# Patient Record
Sex: MALE | Race: WHITE | NOT HISPANIC OR LATINO | Employment: UNEMPLOYED | URBAN - METROPOLITAN AREA
[De-identification: names, ages, dates, MRNs, and addresses within clinical notes are randomized per-mention and may not be internally consistent; named-entity substitution may affect disease eponyms.]

---

## 2021-08-15 ENCOUNTER — HOSPITAL ENCOUNTER (EMERGENCY)
Facility: HOSPITAL | Age: 71
Discharge: HOME | End: 2021-08-16
Attending: EMERGENCY MEDICINE | Admitting: EMERGENCY MEDICINE

## 2021-08-15 DIAGNOSIS — M79.672 PAIN IN BOTH FEET: ICD-10-CM

## 2021-08-15 DIAGNOSIS — M79.671 PAIN IN BOTH FEET: ICD-10-CM

## 2021-08-15 DIAGNOSIS — S90.822A BLISTER (NONTHERMAL), LEFT FOOT, INITIAL ENCOUNTER: Primary | ICD-10-CM

## 2021-08-15 PROCEDURE — 99283 EMERGENCY DEPT VISIT LOW MDM: CPT

## 2021-08-16 VITALS
HEART RATE: 62 BPM | RESPIRATION RATE: 16 BRPM | WEIGHT: 180 LBS | BODY MASS INDEX: 27.28 KG/M2 | SYSTOLIC BLOOD PRESSURE: 154 MMHG | HEIGHT: 68 IN | TEMPERATURE: 98 F | OXYGEN SATURATION: 95 % | DIASTOLIC BLOOD PRESSURE: 86 MMHG

## 2021-08-16 LAB — POCT GLUCOSE: 89 MG/DL (ref 70–110)

## 2021-08-16 PROCEDURE — 25000003 PHARM REV CODE 250: Performed by: EMERGENCY MEDICINE

## 2021-08-16 RX ORDER — BACITRACIN ZINC 500 UNIT/G
OINTMENT (GRAM) TOPICAL 2 TIMES DAILY
Qty: 30 G | Refills: 0 | Status: SHIPPED | OUTPATIENT
Start: 2021-08-16

## 2021-08-16 RX ADMIN — BACITRACIN, NEOMYCIN, POLYMYXIN B 1 EACH: 400; 3.5; 5 OINTMENT TOPICAL at 12:08

## 2021-08-19 ENCOUNTER — HOSPITAL ENCOUNTER (EMERGENCY)
Facility: HOSPITAL | Age: 71
Discharge: HOME | End: 2021-08-19
Attending: EMERGENCY MEDICINE

## 2021-08-19 VITALS
RESPIRATION RATE: 19 BRPM | TEMPERATURE: 99 F | SYSTOLIC BLOOD PRESSURE: 159 MMHG | DIASTOLIC BLOOD PRESSURE: 86 MMHG | OXYGEN SATURATION: 97 % | HEART RATE: 67 BPM

## 2021-08-19 DIAGNOSIS — Z76.0 MEDICATION REFILL: Primary | ICD-10-CM

## 2021-08-19 DIAGNOSIS — S90.829D BLISTER OF FOOT, UNSPECIFIED LATERALITY, SUBSEQUENT ENCOUNTER: ICD-10-CM

## 2021-08-19 PROCEDURE — 99283 EMERGENCY DEPT VISIT LOW MDM: CPT

## 2021-08-19 RX ORDER — BACITRACIN ZINC 500 UNIT/G
OINTMENT (GRAM) TOPICAL 2 TIMES DAILY
Qty: 30 G | Refills: 0 | Status: SHIPPED | OUTPATIENT
Start: 2021-08-19

## 2024-03-04 ENCOUNTER — HOSPITAL ENCOUNTER (EMERGENCY)
Facility: HOSPITAL | Age: 74
Discharge: HOME | End: 2024-03-04
Attending: EMERGENCY MEDICINE

## 2024-03-04 VITALS
BODY MASS INDEX: 30.53 KG/M2 | HEIGHT: 66 IN | HEART RATE: 70 BPM | WEIGHT: 190 LBS | DIASTOLIC BLOOD PRESSURE: 78 MMHG | OXYGEN SATURATION: 95 % | TEMPERATURE: 99 F | SYSTOLIC BLOOD PRESSURE: 145 MMHG | RESPIRATION RATE: 18 BRPM

## 2024-03-04 DIAGNOSIS — J18.9 COMMUNITY ACQUIRED PNEUMONIA OF LEFT LOWER LOBE OF LUNG: ICD-10-CM

## 2024-03-04 DIAGNOSIS — R05.9 COUGH: ICD-10-CM

## 2024-03-04 DIAGNOSIS — J18.9 PNEUMONIA OF LEFT LOWER LOBE DUE TO INFECTIOUS ORGANISM: Primary | ICD-10-CM

## 2024-03-04 LAB
ALBUMIN SERPL BCP-MCNC: 3.8 G/DL (ref 3.5–5.2)
ALP SERPL-CCNC: 71 U/L (ref 55–135)
ALT SERPL W/O P-5'-P-CCNC: 15 U/L (ref 10–44)
ANION GAP SERPL CALC-SCNC: 13 MMOL/L (ref 8–16)
APAP SERPL-MCNC: <3 UG/ML (ref 10–20)
AST SERPL-CCNC: 18 U/L (ref 10–40)
BASOPHILS # BLD AUTO: 0.04 K/UL (ref 0–0.2)
BASOPHILS NFR BLD: 0.5 % (ref 0–1.9)
BILIRUB SERPL-MCNC: 0.4 MG/DL (ref 0.1–1)
BUN SERPL-MCNC: 13 MG/DL (ref 8–23)
CALCIUM SERPL-MCNC: 9 MG/DL (ref 8.7–10.5)
CHLORIDE SERPL-SCNC: 106 MMOL/L (ref 95–110)
CO2 SERPL-SCNC: 21 MMOL/L (ref 23–29)
CREAT SERPL-MCNC: 0.9 MG/DL (ref 0.5–1.4)
DIFFERENTIAL METHOD BLD: ABNORMAL
EOSINOPHIL # BLD AUTO: 0.3 K/UL (ref 0–0.5)
EOSINOPHIL NFR BLD: 4.3 % (ref 0–8)
ERYTHROCYTE [DISTWIDTH] IN BLOOD BY AUTOMATED COUNT: 12.9 % (ref 11.5–14.5)
EST. GFR  (NO RACE VARIABLE): >60 ML/MIN/1.73 M^2
ETHANOL SERPL-MCNC: <10 MG/DL
GLUCOSE SERPL-MCNC: 90 MG/DL (ref 70–110)
HCT VFR BLD AUTO: 41.1 % (ref 40–54)
HGB BLD-MCNC: 14.3 G/DL (ref 14–18)
IMM GRANULOCYTES # BLD AUTO: 0.03 K/UL (ref 0–0.04)
IMM GRANULOCYTES NFR BLD AUTO: 0.4 % (ref 0–0.5)
LYMPHOCYTES # BLD AUTO: 1.9 K/UL (ref 1–4.8)
LYMPHOCYTES NFR BLD: 25 % (ref 18–48)
MCH RBC QN AUTO: 32.1 PG (ref 27–31)
MCHC RBC AUTO-ENTMCNC: 34.8 G/DL (ref 32–36)
MCV RBC AUTO: 92 FL (ref 82–98)
MONOCYTES # BLD AUTO: 0.9 K/UL (ref 0.3–1)
MONOCYTES NFR BLD: 11.7 % (ref 4–15)
NEUTROPHILS # BLD AUTO: 4.3 K/UL (ref 1.8–7.7)
NEUTROPHILS NFR BLD: 58.1 % (ref 38–73)
NRBC BLD-RTO: 0 /100 WBC
PLATELET # BLD AUTO: 274 K/UL (ref 150–450)
PMV BLD AUTO: 8.4 FL (ref 9.2–12.9)
POTASSIUM SERPL-SCNC: 3.7 MMOL/L (ref 3.5–5.1)
PROT SERPL-MCNC: 7.4 G/DL (ref 6–8.4)
RBC # BLD AUTO: 4.46 M/UL (ref 4.6–6.2)
SODIUM SERPL-SCNC: 140 MMOL/L (ref 136–145)
TSH SERPL DL<=0.005 MIU/L-ACNC: 2.3 UIU/ML (ref 0.4–4)
WBC # BLD AUTO: 7.43 K/UL (ref 3.9–12.7)

## 2024-03-04 PROCEDURE — 80143 DRUG ASSAY ACETAMINOPHEN: CPT | Performed by: EMERGENCY MEDICINE

## 2024-03-04 PROCEDURE — 25000003 PHARM REV CODE 250: Performed by: EMERGENCY MEDICINE

## 2024-03-04 PROCEDURE — 85025 COMPLETE CBC W/AUTO DIFF WBC: CPT | Performed by: EMERGENCY MEDICINE

## 2024-03-04 PROCEDURE — G0425 INPT/ED TELECONSULT30: HCPCS | Mod: GT,,, | Performed by: PSYCHIATRY & NEUROLOGY

## 2024-03-04 PROCEDURE — 99285 EMERGENCY DEPT VISIT HI MDM: CPT | Mod: 25

## 2024-03-04 PROCEDURE — 84443 ASSAY THYROID STIM HORMONE: CPT | Performed by: EMERGENCY MEDICINE

## 2024-03-04 PROCEDURE — 80053 COMPREHEN METABOLIC PANEL: CPT | Performed by: EMERGENCY MEDICINE

## 2024-03-04 PROCEDURE — 82077 ASSAY SPEC XCP UR&BREATH IA: CPT | Performed by: EMERGENCY MEDICINE

## 2024-03-04 RX ORDER — AMOXICILLIN 500 MG/1
1000 CAPSULE ORAL 3 TIMES DAILY
Qty: 42 CAPSULE | Refills: 0 | Status: SHIPPED | OUTPATIENT
Start: 2024-03-04 | End: 2024-03-11

## 2024-03-04 RX ORDER — DOXYCYCLINE HYCLATE 100 MG
100 TABLET ORAL EVERY 12 HOURS
Status: DISCONTINUED | OUTPATIENT
Start: 2024-03-04 | End: 2024-03-04 | Stop reason: HOSPADM

## 2024-03-04 RX ORDER — AMOXICILLIN 250 MG/1
1000 CAPSULE ORAL
Status: COMPLETED | OUTPATIENT
Start: 2024-03-04 | End: 2024-03-04

## 2024-03-04 RX ORDER — DOXYCYCLINE 100 MG/1
100 CAPSULE ORAL 2 TIMES DAILY
Qty: 14 CAPSULE | Refills: 0 | Status: SHIPPED | OUTPATIENT
Start: 2024-03-04 | End: 2024-03-11

## 2024-03-04 RX ADMIN — AMOXICILLIN 1000 MG: 250 CAPSULE ORAL at 05:03

## 2024-03-04 RX ADMIN — DOXYCYCLINE HYCLATE 100 MG: 100 TABLET, COATED ORAL at 05:03

## 2024-03-04 NOTE — ED NOTES
Pt attempted to urinate but was unsuccessful. Bladder scan performed, pt did not have anything in bladder. Pt provided with pitcher of water, carton of milk, and food tray. Dr. Fraga made aware of the delay

## 2024-03-04 NOTE — ED PROVIDER NOTES
"Encounter Date: 3/4/2024       History     Chief Complaint   Patient presents with    Shortness of Breath     Patient states he was [place] out [place] state last [place] and was given IV and oral antibiotics. He is finished the course of meds and his symptoms have only mildy improved. He reports ongoing shortness of breath and productive cough. He reports chills and feeling sweaty but unknown if he has recent fever. He is also requesting psych eval for racing thoughts. Denies psych hx, SI, HI, AVH    Psychiatric Evaluation     Patient is a 73-year-old male who presents to the ED with complaint of shortness of breath, cough and psychiatric evaluation.  Patient states he was diagnosed with pneumonia approximately 10 days ago.  He states he was started on both amoxicillin and doxycycline they reports minimal improvement of his symptoms.  He denies any fevers chills chest pain or current shortness of breath when questioned.  Furthermore he denies any nausea vomiting diarrhea night sweats.  Patient also reports increased "racing thoughts" and forgetfulness over the past several days. He denies any significant psych history when questioned. He denies any SI/HI/AVH.       Review of patient's allergies indicates:   Allergen Reactions    Abilify [aripiprazole]      No past medical history on file.  No past surgical history on file.  No family history on file.     Review of Systems   Constitutional:  Negative for chills and fever.   Respiratory:  Positive for cough. Negative for shortness of breath.    Cardiovascular:  Negative for chest pain, palpitations and leg swelling.   Gastrointestinal:  Negative for abdominal pain and nausea.   Genitourinary:  Negative for dysuria and flank pain.   Musculoskeletal:  Negative for arthralgias and gait problem.   Skin:  Negative for pallor and rash.   Neurological:  Negative for dizziness and headaches.   Psychiatric/Behavioral:  Negative for agitation, confusion, " hallucinations, self-injury, sleep disturbance and suicidal ideas. The patient is not nervous/anxious.        Physical Exam     Initial Vitals [03/04/24 0117]   BP Pulse Resp Temp SpO2   (!) 145/78 70 18 98.5 °F (36.9 °C) 95 %      MAP       --         Physical Exam    Nursing note and vitals reviewed.  Constitutional: He appears well-developed and well-nourished.   Well-appearing   No acute distress    HENT:   Head: Normocephalic and atraumatic.   Eyes: Conjunctivae and EOM are normal. Pupils are equal, round, and reactive to light.   Neck: Neck supple.   Normal range of motion.  Cardiovascular:  Normal rate, regular rhythm, normal heart sounds and intact distal pulses.           Pulmonary/Chest: Breath sounds normal.   Abdominal: Abdomen is soft. Bowel sounds are normal.   Musculoskeletal:         General: Normal range of motion.      Cervical back: Normal range of motion and neck supple.     Neurological: He is alert and oriented to person, place, and time.   Skin: Skin is warm. Capillary refill takes less than 2 seconds.   Psychiatric: His speech is delayed and tangential. Thought content is not paranoid and not delusional. He expresses no homicidal and no suicidal ideation. He expresses no suicidal plans and no homicidal plans.   Patient tangential, thought blocking at times.  Did not appear to be responding to internal stimuli; does not appear paranoid or homicidal or suicidal.  He is not aggressive he is oriented to person place and time.         ED Course   Procedures  Labs Reviewed   CBC W/ AUTO DIFFERENTIAL - Abnormal; Notable for the following components:       Result Value    RBC 4.46 (*)     MCH 32.1 (*)     MPV 8.4 (*)     All other components within normal limits   COMPREHENSIVE METABOLIC PANEL - Abnormal; Notable for the following components:    CO2 21 (*)     All other components within normal limits   ACETAMINOPHEN LEVEL - Abnormal; Notable for the following components:    Acetaminophen (Tylenol),  Serum <3.0 (*)     All other components within normal limits   TSH   ALCOHOL,MEDICAL (ETHANOL)   URINALYSIS, REFLEX TO URINE CULTURE   DRUG SCREEN PANEL, URINE EMERGENCY          Imaging Results              X-Ray Chest AP Portable (Final result)  Result time 03/04/24 02:50:56      Final result by Krzysztof Estrada MD (03/04/24 02:50:56)                   Impression:      Pulmonary opacity at the left lung base may relate to components of pulmonary infiltrate/airspace disease with possible confluence and peripheral consolidation and possible small pleural effusion.    Accentuation of interstitial markings bilaterally may relate to a component of mild diffuse interstitial infiltrate/edema.    Follow-up to document resolution is recommended.      Electronically signed by: Krzysztof Estrada  Date:    03/04/2024  Time:    02:50               Narrative:    EXAMINATION:  XR CHEST AP PORTABLE    CLINICAL HISTORY:  Cough, unspecified    TECHNIQUE:  Single frontal view of the chest was performed.    COMPARISON:  None    FINDINGS:  Single portable chest view is submitted.  There is diminished depth of inspiration.  This exaggerates the appearance of the cardiomediastinal silhouette, when accounting for the aforementioned there may be mild cardiac enlargement.    Accentuation of pulmonary bronchovascular markings consistent with diminished depth of inspiration noted.  There is appearance of may relate to superimposed mild interstitial infiltrate/edema.    Asymmetric opacity at the left lung base may relate to pulmonary infiltrate, this is more dense peripherally and may relate to a component of confluent infiltrates/airspace disease, the possibly of a small amount of pleural fluid at the left costophrenic angle is also considered.    There is no significant pleural fluid on the right, there is no pneumothorax bilaterally.                                       Medications   doxycycline tablet 100 mg (100 mg Oral Given 3/4/24  "0513)   amoxicillin capsule 1,000 mg (1,000 mg Oral Given 3/4/24 0514)     Medical Decision Making  Amount and/or Complexity of Data Reviewed  Labs: ordered. Decision-making details documented in ED Course.  Radiology: ordered.    Risk  Prescription drug management.               ED Course as of 03/04/24 0520   Mon Mar 04, 2024   0234 WBC: 7.43 [LC]   0234 RBC(!): 4.46 [LC]   0234 Hemoglobin: 14.3 [LC]   0234 Sodium: 140 [LC]   0234 Potassium: 3.7 [LC]   0234 Creatinine: 0.9 [LC]   0243 Acetaminophen Level(!): <3.0 [LC]   0243 Alcohol, Serum: <10 [LC]   0301 Chest x-ray:  Opacity at LLL base per my independent interpretation.  [LC]   0306 Discussed case with the tele psychiatrist who evaluated the patient.  She has not feel the patient merits PCR involuntary admission at this time. [LC]      ED Course User Index  [LC] Akhil Fraga MD               Medical Decision Making:   Initial Assessment:   See HPI   Clinical Tests:   Lab Tests: Reviewed and Ordered  Radiological Study: Ordered and Reviewed  ED Management:  - VSS; pt afebrile; NAD; non toxic; not septic  - CXR with LLL infiltrate; per chart check, pt with LLL in 1/24; diagnosed with PNA in [place] per patient  - Routine ED labs unremarkable for acute abnormality  - in light of pt's complaint of "racing thoughts" pt formally evaluated by telepsych provider  - psychiatrist does not feel pt merits PEC, psych hospitalization  - will discharge with rx Amoxicillin, Doxycycline for presumed CAP  - pt denies any urinary complaints; pt unable/unwilling to provide urine specimen   - pt stable for discharge from an EM standpoint   - No further intervention is indicated at this time after having taken into account the patient's history, physical exam findings, and empirical and objective data obtained during the patient's emergency department workup.   - The patient is at low risk for an emergent medical condition at this time, and I am of the belief that that " it is safe to discharge the patient from the emergency department.   - The patient is instructed to follow up as outpatient as indicated on the discharge paperwork.    - I have discussed the specifics of the workup with the patient and the patient has verbalized understanding of the details of the workup, the diagnosis, the treatment plan, and the need for outpatient follow-up.    - Although the patient has no emergent etiology today this does not preclude the development of an emergent condition so, in addition, I have advised the patient that they can return to the ED and/or activate EMS at any time with worsening of their symptoms, change of their symptoms, or with any other medical complaint.    - The patient remained comfortable and stable during their visit in the ED.    - Discharge and follow-up instructions discussed with the patient who expressed understanding and willingness to comply with my recommendations.  - Results of all emergency department tests  discussed thoroughly with patient; all patient questions answered; pt in agreement with plan  - Pt instructed to follow up with PCP in 2-3 days for recheck of today's complaints  - Pt given strict emergency department return precautions for any new or worsening of symptoms  - Pt discharged from the emergency department in stable condition, in no acute distress                Clinical Impression:  Final diagnoses:  [R05.9] Cough  [J18.9] Pneumonia of left lower lobe due to infectious organism (Primary)  [J18.9] Community acquired pneumonia of left lower lobe of lung          ED Disposition Condition    Discharge Stable          ED Prescriptions       Medication Sig Dispense Start Date End Date Auth. Provider    doxycycline (VIBRAMYCIN) 100 MG Cap Take 1 capsule (100 mg total) by mouth 2 (two) times daily. for 7 days 14 capsule 3/4/2024 3/11/2024 Akhil Fraga MD    amoxicillin (AMOXIL) 500 MG capsule Take 2 capsules (1,000 mg total) by mouth 3  (three) times daily. for 7 days 42 capsule 3/4/2024 3/11/2024 Akhil Fraga MD          Follow-up Information    None          Akhil Fraga MD  03/04/24 0572

## 2024-03-04 NOTE — ED NOTES
Pt initially stating that he is refusing blood work. Pt informed that he cannot refuse and if he continues to refuse security would be contacted. Pt reluctantly allowed RN to obtain blood work.

## 2024-03-04 NOTE — CONSULTS
"[facility]  Psychiatry  Telepsychiatry Consult Note    Please see previous notes:    Patient agreeable to consultation via telepsychiatry.    Tele-Consultation from Psychiatry started: 3/4/2024 at 0242  The chief complaint leading to psychiatric consultation is: racing thoughts  This consultation was requested by Dr. Akhil Fraga, the Emergency Department attending physician.  The location of the consulting psychiatrist is  [place], [place] .  The patient location is  [facility] EMERGENCY DEPARTMENT   The patient arrived at the ED at: 0125    Also present with the patient at the time of the consultation: none    Patient Identification:   Torey Jackson is a 73 y.o. male.    Patient information was obtained from patient.  Patient presented voluntarily to the Emergency Department    Inpatient consult to Telemedicine - Psychiatry  Consult performed by: Nydia Celestin MD  Consult ordered by: Akhil Fraga MD  Reason for consult: racing thoughts        Consult Start Time: 03/04/2024 02:42 CST  Consult End Time: 03/04/2024 03:12 CST        Subjective:     History of Present Illness:  Patient is a 73yoM with no significant past psychiatric history except accusations more than 10 years ago who presents for concerns of pneumonia and racing thoughts. Dr. Escamilla told him that he had pneumonia several months ago. He is still having problems that he came in. Patient says he has been up for three days coming from [place]; patient says he has [place] address. Once in a while, he lives here. He had a special mask that he was going to wear for [employer]. Three days he has not slept, and his thoughts are racing. When asked about hallucinations, he says he sees splotches that no one else sees.    Patient endorses "not too good" mood and endorses normal appetite; he is hungry right now. Patient denies any sober period of sleep deficit associated with grandiosity/irritability, distractibility, impulsivity, racing " "thoughts, increased activity and talkativeness. The patient denies any current or history of SI/HI or any plan/intent to self-harm or harm others. Denies paranoia. No vocalized delusions.    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 His nephew is a backup for the [employer].    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  Illicit Substances:No  Detox/Rehab: No    Legal History: Past charges/incarcerations: Yes, but he would not explain further    Family Psychiatric History: denied    Social History:  *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  Employment Status/Finances:Retired-"I've been employed by some very [email or web address]"  Relationship Status/Sexual Orientation: Single, never , no recent break ups  Children: 0  Housing Status:  plans to stay at the [facility]     history:  YES: [employer]     Access to gun: NO    Psychiatric Mental Status Exam:  Arousal: alert  Sensorium/Orientation: oriented to grossly intact  Behavior/Cooperation: normal, cooperative   Speech: normal tone, normal rate, normal pitch, normal volume  Language: grossly intact  Mood: " not too good "   Affect:  slightly bizarre  Thought Process: normal and logical  Thought Content:   Auditory hallucinations: NO  Visual hallucinations: NO  Paranoia: NO  Delusions:  NO  Suicidal ideation: NO  Homicidal ideation: NO  Attention/Concentration:  intact  Memory:    Recent:  Intact   Remote: Intact  Insight: intact  Judgment: behavior is adequate to circumstances      Past Medical History: No past medical history on file. "   Laboratory Data:   Labs Reviewed   CBC W/ AUTO DIFFERENTIAL - Abnormal; Notable for the following components:       Result Value    RBC 4.46 (*)     MCH 32.1 (*)     MPV 8.4 (*)     All other components within normal limits   COMPREHENSIVE METABOLIC PANEL - Abnormal; Notable for the following components:    CO2 21 (*)     All other components within normal limits   ACETAMINOPHEN LEVEL - Abnormal; Notable for the following components:    Acetaminophen (Tylenol), Serum <3.0 (*)     All other components within normal limits   ALCOHOL,MEDICAL (ETHANOL)   TSH   URINALYSIS, REFLEX TO URINE CULTURE   DRUG SCREEN PANEL, URINE EMERGENCY       Neurological History:  Seizures: No  Head trauma: No    Allergies:  Review of patient's allergies indicates:   Allergen Reactions    Abilify [aripiprazole]        Medications in ER: Medications - No data to display    Medications at home:     Current Outpatient Medications on File Prior to Encounter   Medication Sig Dispense Refill Last Dose    bacitracin 500 unit/gram Oint Apply topically 2 (two) times daily. 30 g 0     bacitracin 500 unit/gram Oint Apply topically 2 (two) times daily. 30 g 0            Assessment - Diagnosis - Goals:     Diagnosis/Impression: Patient is a 73yoM with no significant past psychiatric history except accusations more than 10 years ago who presents for concerns of pneumonia and racing thoughts. Patient endorses not sleeping for three days and some racing thoughts. He denies SI/HI/AH/VH/paranoia. He is linear and goal-oriented. Does not appear to RIS. Affect is slightly bizarre. There is a potential for underlying low level of psychosis. However, at this time, he is not gravely disabled from any acute psychiatric illness. [facility] from [place]). He plans to go to [employer] for food. He knows where Dr. Poon has a clinic that he has seen before and can follow up for healthcare  concerns. At this time, patient does not meet criteria for an involuntary psychiatric hold. He declines a voluntary psychiatric admission. Patient is open to trialing trazodone for sleep; discussed that side effects could include sedation, dry mouth and priapism. Patient expressed understanding of side effects.    Rec: 1. Dispo/Legal Status:  Pt does not meet criteria for PEC or inpt psych admit at this time. Pt is not currently an imminent danger to self or others and is not gravely disabled due to an acute psych illness.  2. Medication Recommendations: start trazodone 50-100mg at bedtime  3. Follow-up: pcp  4. Return to ED: any true SI/HI or any other acute changes to mental status  5. Case Discussed With: Dr. Akhil Fraga     Time with patient: 17 minutes  In total, 30 minutes were spent on chart review, discussion with ED, patient interview and charting    More than 50% of the time was spent counseling/coordinating care    Consulting clinician was informed of the encounter and consult note.    Consultation ended: 3/4/2024 at 0312    Nydia Celestin MD   Psychiatry  [facility]

## 2024-03-04 NOTE — ED NOTES
Pt aao x 4, answering orientation questions appropriately. Dr. Fraga at the bedside. Per pt, he has arrived via taxi. Pt reports he was diagnosed in [place] with pneumonia, prescribed abx but states he is not feeling better. Pt is also reporting racing thoughts. When pt asked to describe what he meant, pt stated he is forgetting things. Pt reports hx of psychiatric illness but is not on medication due to the meds causing him to have a reaction such as tremors. Pt reports being hospitalized for psychiatric illness about 8-9 years ago. Pt states he has arrived in [place] by train. Pt states he was going to the [facility] but came here for shortness of breath. Pt is displaying thought blocking, loose association of thoughts, and not making sense at times. Pt denies SI/HI. Pt denies recent drug or alcohol use. Pt denies auditory or visual hallucinations. Pt is calm and cooperative at this time. Pt changed into paper scrubs and non skid socks.

## 2024-03-04 NOTE — ED NOTES
Pts belongings: 1 BACK PACK AND 4 PATIENT BELONGINGS BAGS   1 pair of jeans  1 blue baseball cap  1 brown belt  1 black wallet inside of jeans  1 blue jacket  1 gray/black pair of shoes  1 pair of black socks  1 black backpack    Clothes and toiletries    Cell phone and    Short way radio    Headphones   Misc.   1 black raincoat  1 brown blanket     PTS BELONGINGS WANDED BY SECURITY AND PLACED IN LOCKED CLOSET

## 2024-03-27 ENCOUNTER — HOSPITAL ENCOUNTER (EMERGENCY)
Facility: OTHER | Age: 74
Discharge: HOME | End: 2024-03-27
Attending: EMERGENCY MEDICINE

## 2024-03-27 VITALS
OXYGEN SATURATION: 99 % | HEART RATE: 74 BPM | TEMPERATURE: 98 F | SYSTOLIC BLOOD PRESSURE: 160 MMHG | DIASTOLIC BLOOD PRESSURE: 77 MMHG | RESPIRATION RATE: 18 BRPM | HEIGHT: 66 IN | WEIGHT: 180 LBS | BODY MASS INDEX: 28.93 KG/M2

## 2024-03-27 DIAGNOSIS — Z20.822 LAB TEST NEGATIVE FOR COVID-19 VIRUS: ICD-10-CM

## 2024-03-27 DIAGNOSIS — Z11.1 NORMAL SCREENING CHEST X-RAY FOR TUBERCULOSIS: Primary | ICD-10-CM

## 2024-03-27 LAB
CTP QC/QA: YES
SARS-COV-2 RDRP RESP QL NAA+PROBE: NEGATIVE

## 2024-03-27 PROCEDURE — 99283 EMERGENCY DEPT VISIT LOW MDM: CPT | Mod: 25

## 2024-03-27 PROCEDURE — 87635 SARS-COV-2 COVID-19 AMP PRB: CPT | Performed by: EMERGENCY MEDICINE

## 2024-03-27 NOTE — ED PROVIDER NOTES
"     Source of History:  The patient    Chief complaint:  shelter clearance (Pt requesting chest xray for shelter clearance )      HPI:  Torey Jackson is a 73 y.o. male  requesting chest x-ray and COVID test for shelter clearance.  He has no other complaints at this time.    This is the extent to the patients complaints today here in the emergency department.    ROS:   See HPI    Review of patient's allergies indicates:   Allergen Reactions    Abilify [aripiprazole]        PMH:  As per HPI and below:  No past medical history on file.  No past surgical history on file.         Physical Exam:    BP (!) 160/77 (BP Location: Left arm, Patient Position: Sitting)   Pulse 74   Temp 98.2 °F (36.8 °C) (Oral)   Resp 18   Ht 5' 6" (1.676 m)   Wt 81.6 kg (180 lb)   SpO2 99%   BMI 29.05 kg/m²   Nursing note and vital signs reviewed.  Constitutional: No acute distress.  Nontoxic  Eyes: No conjunctival injection. Extraocular muscles intact.  Musculoskeletal: Good range of motion all joints.  No deformities.  Neck supple.  No meningismus. Neurovascularly intact.        Summary of Medical Records:        MDM/ Workup:  73-year-old male needing shows recurrence.  Will get COVID test as well as chest x-ray.  No other complaints at this time that or any further emergency medicine workup.    ED Course as of 03/27/24 0738   Wed Mar 27, 2024   0720 X-Ray Chest 1 View  Chest x-ray independently interpreted by myself shows normal cardiac size, no infiltrate or focal consolidation, no pneumothorax, no bony abnormalities.  Overall impression negative chest x-ray. [SM]   0738 SARS-CoV-2 RNA, Amplification, Qual: Negative [SM]   0738 No further workup is indicated in the emergency department today.  I updated pt regarding results and I counseled pt regarding supportive care measures.  Diagnosis and treatment plan explained to patient. I have answered all questions and the patient is satisfied with the plan of care. Patient discharged home " in stable condition.  [SM]      ED Course User Index  [SM] Byron Pratt DO               Diagnostic Impression:    1. Normal screening chest x-ray for tuberculosis    2. Lab test negative for COVID-19 virus         ED Disposition Condition    Discharge Stable            ED Prescriptions    None       Follow-up Information    None          Byron Pratt DO  03/27/24 0738

## 2024-03-30 ENCOUNTER — HOSPITAL ENCOUNTER (EMERGENCY)
Facility: OTHER | Age: 74
Discharge: HOME | End: 2024-03-30
Attending: EMERGENCY MEDICINE

## 2024-03-30 VITALS
WEIGHT: 180 LBS | BODY MASS INDEX: 29.05 KG/M2 | SYSTOLIC BLOOD PRESSURE: 133 MMHG | TEMPERATURE: 98 F | RESPIRATION RATE: 16 BRPM | HEART RATE: 68 BPM | OXYGEN SATURATION: 96 % | DIASTOLIC BLOOD PRESSURE: 74 MMHG

## 2024-03-30 DIAGNOSIS — K08.89 PAIN, DENTAL: Primary | ICD-10-CM

## 2024-03-30 DIAGNOSIS — K04.01 PULPITIS: ICD-10-CM

## 2024-03-30 DIAGNOSIS — K08.89 TOOTHACHE: ICD-10-CM

## 2024-03-30 PROCEDURE — 99284 EMERGENCY DEPT VISIT MOD MDM: CPT

## 2024-03-30 PROCEDURE — 25000003 PHARM REV CODE 250

## 2024-03-30 RX ORDER — AMOXICILLIN AND CLAVULANATE POTASSIUM 875; 125 MG/1; MG/1
1 TABLET, FILM COATED ORAL
Status: DISCONTINUED | OUTPATIENT
Start: 2024-03-30 | End: 2024-03-30 | Stop reason: HOSPADM

## 2024-03-30 RX ORDER — CHLORHEXIDINE GLUCONATE ORAL RINSE 1.2 MG/ML
15 SOLUTION DENTAL 2 TIMES DAILY
Qty: 150 ML | Refills: 0 | Status: SHIPPED | OUTPATIENT
Start: 2024-03-30 | End: 2024-04-04

## 2024-03-30 RX ORDER — ACETAMINOPHEN 500 MG
500 TABLET ORAL EVERY 6 HOURS PRN
Qty: 10 TABLET | Refills: 0 | Status: SHIPPED | OUTPATIENT
Start: 2024-03-30 | End: 2024-04-04

## 2024-03-30 RX ORDER — CHLORHEXIDINE GLUCONATE ORAL RINSE 1.2 MG/ML
15 SOLUTION DENTAL
Status: COMPLETED | OUTPATIENT
Start: 2024-03-30 | End: 2024-03-30

## 2024-03-30 RX ORDER — KETOROLAC TROMETHAMINE 30 MG/ML
15 INJECTION, SOLUTION INTRAMUSCULAR; INTRAVENOUS
Status: DISCONTINUED | OUTPATIENT
Start: 2024-03-30 | End: 2024-03-30 | Stop reason: HOSPADM

## 2024-03-30 RX ORDER — CLINDAMYCIN HYDROCHLORIDE 150 MG/1
450 CAPSULE ORAL EVERY 8 HOURS
Qty: 45 CAPSULE | Refills: 0 | Status: SHIPPED | OUTPATIENT
Start: 2024-03-30 | End: 2024-04-04

## 2024-03-30 RX ORDER — IBUPROFEN 600 MG/1
600 TABLET ORAL EVERY 6 HOURS PRN
Qty: 20 TABLET | Refills: 0 | Status: SHIPPED | OUTPATIENT
Start: 2024-03-30

## 2024-03-30 RX ADMIN — CHLORHEXIDINE GLUCONATE 0.12% ORAL RINSE 15 ML: 1.2 LIQUID ORAL at 10:03

## 2024-03-30 NOTE — ED NOTES
"Patient refused Abx tablet and ketorolac injection stating," he cannot take the  pill because it will break him out into a rash and he doesn't want an injection."  "

## 2024-03-30 NOTE — DISCHARGE INSTRUCTIONS
Please start and complete clindamycin for possible dental infection.  Please take Tylenol and ibuprofen as needed for dental pain.  Please use Peridex mouthwash as needed for pain.  Please follow-up with a dentist as soon as possible.  Dental resources have been provided.

## 2024-03-30 NOTE — ED PROVIDER NOTES
Encounter Date: 3/30/2024       History     Chief Complaint   Patient presents with    Dental Pain     R top molar pain x 6mo; no relief OTC. Unable to see dentist d/t insurance. Denies fever/chills.      Torey Jackson is a 73 y.o. male presenting to the emergency department for evaluation of worsening, right, upper, tooth pain for 6 months. Patient states that he has not taken any medications for his pain. He states that he had an appointment in [place] a few months ago but missed it. States that he moved to [place] one month ago and has not been able to see a dentist here yet. He is currently residing at the [facility]. He denies fever, chills, sore throat, ear pain, trouble swallowing, facial swelling, hoarseness, SOB, lock jaw, or drooling.      The history is provided by the patient.     Review of patient's allergies indicates:   Allergen Reactions    Abilify [aripiprazole]      No past medical history on file.  No past surgical history on file.  No family history on file.     Review of Systems   Constitutional:  Negative for chills and fever.   HENT:  Positive for dental problem. Negative for congestion, drooling, ear pain, facial swelling, rhinorrhea, sore throat, trouble swallowing and voice change.    Respiratory:  Negative for cough and shortness of breath.    Cardiovascular:  Negative for chest pain.   Gastrointestinal:  Negative for abdominal pain, diarrhea, nausea and vomiting.   Genitourinary:  Negative for dysuria, frequency and urgency.   Musculoskeletal:  Negative for back pain.   Skin:  Negative for rash.   Neurological:  Negative for dizziness and headaches.   Psychiatric/Behavioral:  Negative for confusion.        Physical Exam     Initial Vitals [03/30/24 0916]   BP Pulse Resp Temp SpO2   133/74 68 16 97.7 °F (36.5 °C) 96 %      MAP       --         Physical Exam    Vitals reviewed.  Constitutional: He appears well-developed and well-nourished. No distress.   HENT:   Head:  Normocephalic and atraumatic.   Right Ear: External ear normal.   Left Ear: External ear normal.   Nose: Nose normal.   Mouth/Throat: Oropharynx is clear and moist.   Poor dentition throughout. Right upper second molar is loose. No dental abscess. No gingivitis.    Eyes: Conjunctivae and EOM are normal. Pupils are equal, round, and reactive to light.   Neck: Neck supple.   Normal range of motion.  Musculoskeletal:         General: Normal range of motion.      Cervical back: Normal range of motion and neck supple.     Neurological: He is alert and oriented to person, place, and time. He has normal strength. GCS eye subscore is 4. GCS verbal subscore is 5. GCS motor subscore is 6.   Skin: Skin is warm and dry.   Psychiatric: He has a normal mood and affect. His behavior is normal. Judgment and thought content normal.         ED Course   Procedures  Labs Reviewed - No data to display       Imaging Results    None          Medications   ketorolac injection 15 mg (15 mg Intramuscular Not Given 3/30/24 1030)   amoxicillin-clavulanate 875-125mg per tablet 1 tablet (1 tablet Oral Not Given 3/30/24 1030)   chlorhexidine 0.12 % solution 15 mL (15 mLs Mouth/Throat Given 3/30/24 1042)     Medical Decision Making  Risk  OTC drugs.  Prescription drug management.                          Medical Decision Making:   Initial Assessment:   Urgent evaluation of 74 yo male presenting with right upper tooth pain for 6 months. Has not taken any OTC meds. Has not followed up with a dentist yet.  He denies fever, chills, hoarseness, locked jaw, drooling, trouble swallowing, or sore throat.  On exam, he is well-appearing and nontoxic.  Hemodynamically stable.  Afebrile in the ED. exam notable for poor dentition throughout.  Right upper 2nd molar is loose.  No obvious dental cristina.  No dental abscess.  No surrounding gingivitis.  Will give dose of Augmentin, analgesics and Peridex solution.  Differential Diagnosis:   Dental pain, pulpitis,  dental cristina, dental abscess, gingivitis  ED Management:  Per nurse, patient refused all medications in the ED. Discharged with prescriptions for clindamycin, Tylenol, ibuprofen, Peridex solution.  Dental resources were provided.  I instructed the patient to follow with a dentist as soon as possible.  Patient verbalized understanding and agreement with this plan of care. He was given specific return precautions. Advised to follow up with PCP as needed. All questions and concerns addressed. He is stable for discharge.     This note was created with MModal Fluency Direct Dictation. Please excuse any spelling or grammatical errors.             Clinical Impression:  Final diagnoses:  [K08.89] Pain, dental (Primary)  [K08.89] Toothache  [K04.01] Pulpitis          ED Disposition Condition    Discharge Stable          ED Prescriptions       Medication Sig Dispense Start Date End Date Auth. Provider    clindamycin (CLEOCIN) 150 MG capsule Take 3 capsules (450 mg total) by mouth every 8 (eight) hours. for 5 days 45 capsule 3/30/2024 4/4/2024 Ganesh Roberts PA-C    acetaminophen (TYLENOL) 500 MG tablet Take 1 tablet (500 mg total) by mouth every 6 (six) hours as needed for Pain or Temperature greater than. 10 tablet 3/30/2024 4/4/2024 Ganesh Roberts PA-C    ibuprofen (ADVIL,MOTRIN) 600 MG tablet Take 1 tablet (600 mg total) by mouth every 6 (six) hours as needed for Pain. 20 tablet 3/30/2024 -- Ganesh Roberts PA-C    chlorhexidine (PERIDEX) 0.12 % solution Use as directed 15 mLs in the mouth or throat 2 (two) times daily. for 5 days 150 mL 3/30/2024 4/4/2024 Ganesh Roberts PA-C          Follow-up Information    None          Ganesh Roberts PA-C  03/30/24 1200

## 2024-04-18 ENCOUNTER — HOSPITAL ENCOUNTER (EMERGENCY)
Facility: OTHER | Age: 74
Discharge: HOME | End: 2024-04-18
Attending: EMERGENCY MEDICINE

## 2024-04-18 VITALS
HEIGHT: 68 IN | RESPIRATION RATE: 19 BRPM | BODY MASS INDEX: 27.28 KG/M2 | WEIGHT: 180 LBS | DIASTOLIC BLOOD PRESSURE: 76 MMHG | SYSTOLIC BLOOD PRESSURE: 141 MMHG | TEMPERATURE: 98 F | HEART RATE: 63 BPM | OXYGEN SATURATION: 95 %

## 2024-04-18 DIAGNOSIS — K08.89 PAIN, DENTAL: Primary | ICD-10-CM

## 2024-04-18 PROCEDURE — 99282 EMERGENCY DEPT VISIT SF MDM: CPT

## 2024-04-18 NOTE — ED PROVIDER NOTES
"Encounter Date: 4/18/2024    SCRIBE #1 NOTE: I, Magdiel Curtis, am scribing for, and in the presence of,  Maurisio Jackson MD. I have scribed the following portions of the note - Other sections scribed: HPI, ROS, PE.       History     Chief Complaint   Patient presents with    Dental Pain     Pt here for a "bad tooth" Right upper molar x months. Seen here about 1 month ago for same and referred to dentist who "wanted to do xrays and so much, I just want it pulled. It's too much." Pt states dentist said if they pulled it he may get a dry socket. Speaking clearly, states he eats on the other side of his mouth.     Time seen by provider: 7:45 AM    This is a 73 y.o. male who presents with complaint of right upper molar pain and instability for the past several months. He recalls this beginning after biting into a popcorn kernel. Patient was seen here 1 month ago for this and referred to a dentist, which he saw ten days ago. However, he was told he would need 3D imaging prior to removing to tooth and is worried his insurance will not cover this, so he declined workup at that time. While he has been taking his prescribed mouth wash, he has not taken any of the antibiotics due to being 'too busy." He has also not taken any prescribed medication for pain, explaining that these provide no relief. Patient has multiple other tooth composites which were placed in [place].  No facial swelling, fevers, or other associated symptoms.  He is still able to eat using the left side of his mouth.    The history is provided by the patient.     Review of patient's allergies indicates:   Allergen Reactions    Abilify [aripiprazole]      No past medical history on file.  No past surgical history on file.  No family history on file.     Review of Systems   Constitutional:  Negative for fever.   HENT:  Positive for dental problem. Negative for sore throat.    Respiratory:  Negative for shortness of breath.    Cardiovascular:  " Negative for chest pain.   Gastrointestinal:  Negative for nausea.   Genitourinary:  Negative for dysuria.   Musculoskeletal:  Negative for back pain.   Skin:  Negative for rash.   Neurological:  Negative for weakness.   Hematological:  Does not bruise/bleed easily.       Physical Exam     Initial Vitals [04/18/24 0723]   BP Pulse Resp Temp SpO2   (!) 141/76 63 19 98.1 °F (36.7 °C) 95 %      MAP       --         Physical Exam    Nursing note and vitals reviewed.  Constitutional: He appears well-developed and well-nourished. He is not diaphoretic. No distress.   HENT:   Head: Normocephalic and atraumatic.   Poor dentition. Right upper molar chronic fracture with minimal surrounding gingival erythema. No abscess.  No facial edema.   Eyes: Conjunctivae are normal. No scleral icterus.   Neck: Neck supple.   Musculoskeletal:         General: No edema.      Cervical back: Neck supple.     Neurological: He is alert and oriented to person, place, and time.   Skin: Skin is warm and dry.   Psychiatric: He has a normal mood and affect.         ED Course   Procedures  Labs Reviewed - No data to display       Imaging Results    None          Medications - No data to display  Medical Decision Making      73-year-old male with no known comorbidities presents for evaluation of persistent right upper molar dental pain after he cracked his tooth eating popcorn months ago.  He was seen here for this about a month ago, was prescribed antibiotics but he did not take them, has been using the mouth washes and ibuprofen with some improvement, but he still has significant pain whenever he tries to eat using the right side of his mouth.  He states he was able to see a dentist about 10 days ago and had imaging and workup ordered for possible tooth removal, but is still getting insurance to approve it.  No new injuries or other complaints, no facial swelling or fevers.  On exam patient with right upper molar with chronic fracture and  instability, minimal surrounding gingival erythema but no dental abscess or facial swelling.  Differential diagnosis includes chronic dental pain from fracture, gingivitis, lower suspicion for dental infection.  Patient primarily came to the ED to see if we could remove the tooth here, and we had extensive discussion about how this is not routinely done in the ED, and he expresses understanding.  Will give dental resources so he can find alternate dentist or clinic that will take his insurance and facilitate tooth extraction.  Low suspicion for active dental infection at this time, patient advised to start taking prescribed clindamycin if he develops facial swelling or worsening pain, fevers.  He understands to return to the ED for any worsening pain or facial swelling, fevers before then.                Scribe Attestation:   Scribe #1: I performed the above scribed service and the documentation accurately describes the services I performed. I attest to the accuracy of the note.    I, Dr. Maurisio Jackson, personally performed the services described in this documentation. All medical record entries made by the scribe were at my direction and in my presence.  I have reviewed the chart and agree that the record reflects my personal performance and is accurate and complete. Maurisio Jackson MD.                                  Clinical Impression:  Final diagnoses:  [K08.89] Pain, dental (Primary)          ED Disposition Condition    Discharge Stable          ED Prescriptions    None       Follow-up Information       Follow up With Specialties Details Why Contact Info    Dentist  Schedule an appointment as soon as possible for a visit in 3 days               Maurisio Jackson MD  04/18/24 8317

## 2024-10-24 ENCOUNTER — PATIENT MESSAGE (OUTPATIENT)
Dept: RESEARCH | Facility: HOSPITAL | Age: 74
End: 2024-10-24